# Patient Record
Sex: FEMALE | Race: AMERICAN INDIAN OR ALASKA NATIVE
[De-identification: names, ages, dates, MRNs, and addresses within clinical notes are randomized per-mention and may not be internally consistent; named-entity substitution may affect disease eponyms.]

---

## 2019-04-10 ENCOUNTER — HOSPITAL ENCOUNTER (EMERGENCY)
Dept: HOSPITAL 42 - ED | Age: 29
Discharge: HOME | End: 2019-04-10
Payer: MEDICAID

## 2019-04-10 VITALS
TEMPERATURE: 98.4 F | SYSTOLIC BLOOD PRESSURE: 137 MMHG | OXYGEN SATURATION: 100 % | RESPIRATION RATE: 18 BRPM | DIASTOLIC BLOOD PRESSURE: 84 MMHG | HEART RATE: 92 BPM

## 2019-04-10 DIAGNOSIS — L03.116: ICD-10-CM
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
## 2019-04-10 DIAGNOSIS — S80.862A: Primary | ICD-10-CM

## 2019-04-10 NOTE — ED PDOC
Arrival/HPI





- General


Time Seen by Provider: 04/10/19 17:20





- History of Present Illness


Narrative History of Present Illness (Text): 





28 yr old female w/ no ppmhx p/w LLE redness and itchiness. Pt notes LLE redness

and itchininess began last night, circular, without streaking. She notes that 

she may have bitten by a bug last night night. and notes that the area of 

redness has exapanded. She denies seeing any spiders with a red hourglass or a 

brown spider of any kind. She denies any fall or trauma. No leg swelling or 

parathesias. No running in the forest or contact with any poison ivy. No new 

creams. No fever, chills or night sweats.  





No other complaints. 











Family/Social History


Family/Social History: Unknown Family HX





Allergies/Home Meds


Allergies/Adverse Reactions: 


Allergies





No Known Allergies Allergy (Verified 04/10/19 17:28)


   











Review of Systems





- Review of Systems


Constitutional: absent: Fatigue, Weight Change


Eyes: absent: Vision Changes, Photophobia


ENT: absent: Hearing Changes


Respiratory: absent: SOB, Cough, Sputum


Cardiovascular: absent: Chest Pain, Palpitations, Calf Pain, AVENDANO


Gastrointestinal: absent: Abdominal Pain


Genitourinary Female: absent: Dysuria, Frequency, Hematuria


Musculoskeletal: absent: Arthralgias, Back Pain, Neck Pain


Skin: Rash.  absent: Pruritis, Laceration, Abscess, Ulcer


Neurological: absent: Headache, Dizziness, Focal Weakness, Gait Changes, Speech 

Changes


Endocrine: absent: Diaphoresis


Hemo/Lymphatic: absent: Adenopathy


Psychiatric: absent: Anxiety





Physical Exam


Vital Signs Reviewed: Yes


Temperature: Afebrile


Blood Pressure: Normal


Pulse: Regular


Respiratory Rate: Normal


Appearance: Positive for: Well-Appearing, Non-Toxic


Pain Distress: None


Mental Status: Positive for: Alert and Oriented X 3





- Systems Exam


Head: Present: Atraumatic


Pupils: Present: PERRL


Extroacular Muscles: Present: EOMI


Conjunctiva: Present: Normal


Mouth: Present: Moist Mucous Membranes


Pharnyx: Present: Normal.  No: ERYTHEMA, EXUDATE


Neck: Present: Normal Range of Motion.  No: Meningeal Signs, MIDLINE TENDERNESS


Respiratory/Chest: Present: Clear to Auscultation, Good Air Exchange


Cardiovascular: Present: Regular Rate and Rhythm, Normal S1, S2.  No: Murmurs


Abdomen: Present: Normal Bowel Sounds.  No: Tenderness, Distention, Peritoneal 

Signs


Back: Present: Normal Inspection.  No: CVA Tenderness, Midline Tenderness


Upper Extremity: Present: Normal Inspection, Normal ROM, NORMAL PULSES, 

Neurovascularly Intact, Capillary Refill < 2s.  No: Deformity


Lower Extremity: Present: NORMAL PULSES, Normal ROM, Neurovascularly Intact.  

No: Edema, CALF TENDERNESS, Cyanosis, Milton's Sign, Swelling


Neurological: Present: GCS=15, CN II-XII Intact, Speech Normal


Skin: Present: Warm, Dry, Rashes (anterior RLE circular erythema w/ small serous

filled blister at center. erythema 4 cm in diamter, circular, blanches, no 

crepitus or fluctuance. )


Psychiatric: Present: Alert, Oriented x 3, Normal Insight





Medical Decision Making


ED Course and Treatment: 





28 yr old female p/w LLE redness and blanching erythema to anterior shin. No 

distal swelling. N/V intact distally. No signs of trauma or fall. No streaking. 

No hx of immunocompromise. Likely mild cellulitis 2/2 insect bite. No signs of 

crepitus. 





Benadryl and abx script written, endorsed to pt to followup with PMD / clinic 

and to return if worsens or if not improved. She is agreeable to plan 














Disposition/Present on Arrival





- Present on Arrival


Any Indicators Present on Arrival: No





- Disposition


Have Diagnosis and Disposition been Completed?: Yes


Diagnosis: 


 Cellulitis, Bug bite





Disposition Time: 17:51


Condition: STABLE


Discharge Instructions (ExitCare):  Insect Bites and Stings (DC), Cellulitis 

(ED)


Additional Instructions: 





RETURN IF WORSENING OR IF STREAKING NOTED IN SPITE OF ANTIBIOTIC TREATMENT. 

RETURN FOR ANY OTHER ISSUES





NAHOMY VARNER, thank you for letting us take care of you today. Your provider 

was Blake Blancas and you were treated for ABNORMAL SKIN INTEGRITY, LOWER EXTREMITY 

PROBLEM. The emergency medical care you received today was directed at your 

acute symptoms. If you were prescribed any medication, please fill it and take 

as directed. It may take several days for your symptoms to resolve. Return to 

the Emergency Department if your symptoms worsen, do not improve, or if you have

any other problems.





Please contact your doctor or call one of the physicians/clinics you have been 

referred to that are listed on the Patient Visit Information form that is 

included in your discharge packet. Bring any paperwork you were given at 

discharge with you along with any medications you are taking to your follow up 

visit. Our treatment cannot replace ongoing medical care by a primary care 

provider outside of the emergency department.





Thank you for allowing the [facility] team to be part of your care today.








If you had an X-Ray or CT scan: A Radiologist will review the ED reading if any 

change in treatment is needed we will contact you.***





If you had a blood, urine, or wound culture: It will take several days for the 

results, if any change in treatment is needed we will contact you.***





If you had an STI test: It will take 48 hours for the results. Please call after

1 week if you have not heard back.***


Prescriptions: 


Cephalexin [Keflex] 500 mg PO Q6H 5 Days #20 capsule


Referrals: 


[facility] [Outside] - Follow up with primary


 Service [Outside] - Follow up with primary


[facility] [Outside] - Follow up with primary


Lucia Meneses MD [Medical Doctor] - Follow up with primary